# Patient Record
(demographics unavailable — no encounter records)

---

## 2025-07-02 NOTE — PHYSICAL EXAM
[de-identified] : Right foot and ankle examination demonstrates tenderness along the midportion of the plantar fascia. [de-identified] : Right foot radiographs (AP/lateral/oblique) were obtained today Indication-pain Radiographs demonstrate a plantar calcaneal spur. These radiographs were interpreted and reviewed with the patient.

## 2025-07-02 NOTE — ASSESSMENT
[FreeTextEntry1] : Patient has right plantar fasciitis.  She has had extensive treatment in the past.  I reviewed the treatment options with her.  After appropriate discussion, she is opting for physical therapy.  Although treatment options were discussed.  She will follow-up in about 4 to 6 weeks depending on her symptoms.  All questions were answered.

## 2025-07-02 NOTE — HISTORY OF PRESENT ILLNESS
[FreeTextEntry1] : The patient is a 72-year-old female chief complaint of right plantar heel pain.  She is a former patient mine from [facility] I have treated in the past for plantar fasciitis.  Her symptoms this time are only present about 5 days.  Years ago when I treated her they were present for a long period of time.  The symptoms are on the heel slightly distal to the plantar fascia.